# Patient Record
Sex: FEMALE | Race: WHITE | NOT HISPANIC OR LATINO | Employment: PART TIME | URBAN - METROPOLITAN AREA
[De-identification: names, ages, dates, MRNs, and addresses within clinical notes are randomized per-mention and may not be internally consistent; named-entity substitution may affect disease eponyms.]

---

## 2017-05-17 ENCOUNTER — PRE VISIT (OUTPATIENT)
Dept: ORTHOPEDICS | Facility: CLINIC | Age: 62
End: 2017-05-17

## 2017-05-17 DIAGNOSIS — M25.511 RIGHT SHOULDER PAIN: Primary | ICD-10-CM

## 2017-05-17 NOTE — TELEPHONE ENCOUNTER
Pt called x 1, left VM (Kaylyn HAMPTON CMA).   Right shoulder pain.  No records in chart.   XR ordered in case Pt doesn't call back for Previsit.    Иван Beckford RN

## 2017-05-18 ENCOUNTER — RADIANT APPOINTMENT (OUTPATIENT)
Dept: GENERAL RADIOLOGY | Facility: CLINIC | Age: 62
End: 2017-05-18
Attending: ORTHOPAEDIC SURGERY
Payer: COMMERCIAL

## 2017-05-18 ENCOUNTER — OFFICE VISIT (OUTPATIENT)
Dept: ORTHOPEDICS | Facility: CLINIC | Age: 62
End: 2017-05-18
Payer: COMMERCIAL

## 2017-05-18 VITALS
HEART RATE: 76 BPM | BODY MASS INDEX: 26.29 KG/M2 | HEIGHT: 64 IN | WEIGHT: 154 LBS | SYSTOLIC BLOOD PRESSURE: 128 MMHG | DIASTOLIC BLOOD PRESSURE: 84 MMHG

## 2017-05-18 DIAGNOSIS — M75.101 TEAR OF RIGHT ROTATOR CUFF, UNSPECIFIED TEAR EXTENT: Primary | ICD-10-CM

## 2017-05-18 DIAGNOSIS — M25.511 RIGHT SHOULDER PAIN: ICD-10-CM

## 2017-05-18 PROCEDURE — 73030 X-RAY EXAM OF SHOULDER: CPT | Mod: RT | Performed by: RADIOLOGY

## 2017-05-18 PROCEDURE — 99203 OFFICE O/P NEW LOW 30 MIN: CPT | Performed by: ORTHOPAEDIC SURGERY

## 2017-05-18 ASSESSMENT — PAIN SCALES - GENERAL: PAINLEVEL: MILD PAIN (3)

## 2017-05-18 NOTE — PATIENT INSTRUCTIONS
[facility]  [address] [place] [id number]  Phone [phone]  Fax [phone]    SURGICAL INFORMATION & INSTRUCTIONS  Dr. aMrtha Peralta  Name of Surgery: Right shoulder arthroscopy, open biceps tenodesis, posssible arthroscopic cuff repair  Date of Surgery:   A surgery scheduler will contact you within 1 week of your office visit with the surgeon.  If you don't receive a phone call, please call [phone].  Arrival Time:   Time of Surgery:    Please arrive early so that we can prepare you for surgery, if you arrive later than your scheduled arrival time your surgery may be cancelled.  Please note that scheduled times may change.    Location of Surgery:   ?  [facility]  5th Floor   [address] [place] [address]  Phone [phone]  Fax [phone]  [email or web address]  ?  [place] [address] [address]  Phone [phone]  Fax [phone]  [email or web address]    Prior to surgery  ?   Call your insurance company   Ask if you need pre-approval for your surgery.  If pre-approval is needed, please call our surgery scheduler for assistance with the pre-approval process.   If you do not have insurance, please let us know.     ?   Schedule an appointment with a Primary Care Provider for a Pre-Op Physical.  This should be done within 30 days of surgery  If you do not have a primary care provider, you may call [facility] at [phone], for an appointment.  Please have your office note and any labs or tests faxed to the facility where you are having surgery. Please be sure to request a copy of your pro-op physical and bring it with you on the day of surgery.      Tell your provider if you have any of the following:   - IF you have a pacemaker or an ICD (implanted cardiac defibrillator). If you do, please bring the ID card with you on the day of surgery  - IF you're a  smoker. People who smoke have a higher risk of infection after surgery. Ask your provider how you can quit smoking.  - If you have diabetes, work with your provider to control your blood sugar. If its not well-controlled, we may need to delay surgery (or you may have problems healing afterward).  - If your surgeon asks you to see your dentist: You'll need to complete any dental work before surgery. Your dentist must send a letter to your surgery center saying it's ok to do the surgery.  ?   Pre-Op Phone Call  You will receive a pre-op phone call 1-3 days before surgery to review your eating and drinking restrictions, review medications, and confirm surgery times.      ?   7-10 days BEFORE surgery  ? Stop taking aspirin, Plavix or aspirin products 10 days before surgery or as directed by your doctor.    ? Stop taking non-steroidal anti-inflammatory medications (naproxen/Aleve, ibuprofen/Advil/Motrin, celecoxib/Celebrex, meloxicam/Mobic) 1 week before surgery or as directed by your surgeon.  This will reduce the risk of bleeding during surgery.    ? Stop taking fish oil (Omega-3-fatty acid) 1 week before surgery.    ? It is OK to take acetaminophen (Tylenol) up until 2 hours prior to surgery.    ? Take prescription medications as directed by your doctor.  Discuss which medications to take or hold prior to your surgery, with your primary care doctor.      ? If you have diabetes, ask your primary care doctor or endocrinologist how you should take your medication(s).  ?   24 hours BEFORE surgery    ? Stop drinking alcohol (beer, wine, liquor) at least 24-hours before and after your surgery.     ?   Evening BEFORE surgery      You may eat a normal meal the night before surgery, but eat nothing after midnight.       Take a shower - to help wash away bacteria (germs) from your skin.  It s normal to have bacteria on your skin and skin protects us from these germs.  When you have surgery, we cut the skin.  Sometimes germs get  into the cuts and cause infection (illness caused by germs).  By following the showering instructions and using the special soap, you will lower the number of germs on your skin.  This decreases your chance of an infection.    o Buy or get 8 ounces of antiseptic surgical soap called 4% CHG.  Common brands of this soap are Hibiclens and Exidine.      o You can find it this soap at your local pharmacy, clinic or retail store.  If you have trouble finding it, ask your pharmacist to help you find the right substitute.      o Do not shave within 12 inches of your incision (surgical cut) area for at least 3 days before surgery.  Shaving can make small cuts in the skin.  This puts you at a higher risk of infection.  Items you will need for each shower:     Newly washed towel    4 ounces of one of the recommended soaps    Follow these instructions, the evening before surgery       Wash your hair and body with your regular shampoo and soap. Make sure you rinse the shampoo and soap from your hair and body.      Using clean hands, apply about 2 ounces of soap gently on your skin from the neck to your toes. Use on your groin area last. Do not use this soap on your face or head. If you get any soap in your eyes, ears or mouth, rinse right away.       Repeat step 2. It is very important to let the soap stay on your skin for at least 1 minute.       Rinse well and dry off using a clean towel.  If you feel any tingling, itching or other irritation, rinse right away. It is normal to feel some coolness on the skin after using the antiseptic soap. Your skin may feel a bit dry after the shower, but do not use any lotions, creams or moisturizers. Do not use hair spray or other products in your hair.      Dress in freshly washed clothes or pajamas. Use fresh pillowcases and sheets on your bed.    ?   Day of Surgery    You may drink clear liquids up to 2 hours before surgery or as directed by your surgeon.  Clear liquids include: Water,  Pedialyte, Gatorade, apple juice and liquids you can read through. Please avoid liquids that are red or orange in color.     Do NOT drink: milk, coffee, liquids that have pulp, orange juice, and lemonade or tomato juice.     Do NOT chew gum, chew tobacco or suck on hard candy.      Take another shower            Follow these instructions:        Wash your hair and body with your regular shampoo and soap. Make sure you rinse the shampoo and soap from your hair and body.      Using clean hands, apply about 2 ounces of soap gently on your skin from the neck to your toes. Use on your groin area last. Do not use this soap on your face or head. If you get any soap in your eyes, ears or mouth, rinse right away.       Repeat step 2. It is very important to let the soap stay on your skin for at least 1 minute.       Rinse well and dry off using a clean towel.  If you feel any tingling, itching or other irritation, rinse right away. It is normal to feel some coolness on the skin after using the antiseptic soap. Your skin may feel a bit dry after the shower, but do not use any lotions, creams or moisturizers. Do not use hair spray or other products in your hair.      Dress in freshly washed clothes.    Do not wear deodorant, cologne, lotion, makeup, nail polish or jewelry to surgery.    ?   If there is any change in your health PRIOR to your surgery, please contact your surgeon's office.  Such as a fever, cold, cough, rash, etc     ? Arrange for someone to drive you home after surgery.    will need to be a responsible adult (18 years or older) that will provide transportation to and from surgery and stay in the waiting room during your surgery. You may not drive yourself or take public transportation to and from surgery.    ?   Arrange for someone to stay with you for 24 hours after you go home.   This person must be a responsible adult (18 years or older).    ?   Bring these items to the hospital/surgery center:    ? Insurance card(s)  ? Money for parking and co-pays, if needed  ? A list of all the medications you take, including vitamins, minerals, herbs and over the counter medications.    ? A copy of your Advance Health Care Directive, if you have one.  This tells us what treatment(s) you would or would not want, and who would make health care decisions, if you could no longer speak for yourself.    ? A case for glasses, contact lenses, hearing aids or dentures.   ? Your inhaler or CPAP machine, if you use these at home    ?   Leave extra cash, jewelry and other valuables at home.       ?   Other information:     Sleep Apnea: Let your nurse know if you have a history of sleep apnea, only if you are having surgery at the [facility].    When you arrive for surgery  When you get to the surgery center/hospital, you will:    Check in. If you are under age 18, you must be with a parent or legal guardian.      Sign consent forms, if you haven t already. These forms state that you know the risks and benefits of surgery. When you sign the forms, you give us permission to do the surgery. Do not sign them unless you understand what will happen during and after your surgery. If you have any questions about your surgery, ask to speak with your doctor before you sign the forms. If you don t understand the answers, ask again.      Receive a copy of the Patient s Bill of Rights. If you do not receive a copy, please ask for one.      Change into hospital clothes. Your belongings will be placed in a bag. We will return them to you after surgery.      Meet with the anesthesia provider. He or she will tell you what kind of anesthesia (medicine) will be used to keep you comfortable during surgery.      Remember: it s okay to remind doctors and nurses to wash their hands before touching you.      In most cases, your surgeon will use a marker to write his or her initials on the surgery site. This ensures that the exact site is  operated on.      For safety reasons, we will ask you the same questions many times. For example, we may ask your name and birth date over and over again.      Friends and family can stay with you until it s time for surgery. While you re in surgery, they will be in the waiting area. Please note that cell phones are not allowed in some patient care areas.      If you have questions about what will happen in the operating room, talk to your care team.      You will meet with an anesthesiologist, before your surgery.  He or she may reference types of anesthesia commonly used for surgeries:     General:  This involves the use of an IV for injection of medication and anesthesia. You are put into a sleep and have a breathing tube to assist you with breathing.    Sedation:  You are asleep, but not so deply that you need a breathing tube.     Local or Regional: a nerve is injected to numb the surgical area.    Spinal: you are numbed from the waist down with medicine injected into your back.    Femoral Nerve Block:  Anesthesia injected into the groin of leg which you are having surgery on.      After surgery  We will move you to a recovery room, where we will watch you closely. If you have any pain or discomfort, tell your nurse. He or she will try to make you comfortable.      If you are staying overnight, we will move you to your hospital room after you are awake.      If you are going home, we will move you to another room. Friends and family may be able to join you. The length of time you spend in recovery depend on the type of medicine you received, your medical condition, the type of surgery you had, or your response to the anesthesia given during your procedure.      When you are discharged from the recovery room, the nurses will review instructions with you and your caregiver.      Please wash your hands every time you touch the wound or change bandages or dressings.      Do not submerge the wound in water.  You may  not use a bathtub or hot tub until the wound is closed. The wait time frame is generally 2-3 weeks, but any open area can be a source of incoming bacteria, so it is better to be on the safe side and avoid water submersion until your wound is fully healed.  Keep all dressings clean and dry.       If you had surgery on your arm or leg, elevate it as much as possible to help reduce swelling.      You may put ice on the surgical area for comfort, keeping ice on area for up to 20 minutes then off for 40 minutes.  You may do this the first few days after surgery to help reduce pain and swelling.        Drink at least 8-10 glasses of liquid each day to help your body heal.      Keep your lungs clear by coughing and taking deep breaths every couple hours.  This is especially important the first 48 hours after surgery.      Notify your doctor if you have any of the following:   o Fever of 101 F or higher  o Numbness and/or tingling  o Increased pain, redness or swelling  o Drainage from wound  o Prolonged or uncontrolled bleeding  o Difficulty with movement  ?  Physical Therapy  Think about where you would like to have physical therapy (PT) after your surgery. You will be instructed by your surgical team on when to start PT after surgery. If you have questions regarding this, please contact the orthopedic clinic.    Follow-up Appointments, in Clinic  If you don't already have an appointment scheduled, please call to set up an appointment at [phone].    ?   Post-Op appointments with provider. (2 and 6 weeks post op)    Dealing with pain  A nurse will check your comfort level often during your stay. He or she will work with you to manage your pain.  It s expected that you will have pain after surgery.  Our goal is to reduce or minimize pain by:     Remember pain is real. There are many ways to control pain. We can help you decide what works best for you.    Ask for pain medicine when you need it.  Don t try to  tough it  out --this can make you feel worse. Always take your medicine as ordered.    Medicine doesn t work the same for everyone. If your medicine isn t working, tell your nurse. There may be other medicines or treatments we can try.    Medication Refills.  If you need refills on your pain medication, please call the clinic as soon as possible.  It may take 72-business hours to obtain a refill.  Refills must be picked up at check-in 2, [facility] or mailed to a pharmacy of your choice.      It is expected that you will wean off the pain medications in a timely manner.     Constipation is a common side effect of pain medication, decreased activity and anesthesia from surgery.  Take a stool softener as prescribed by your doctor at the time of discharge.  You may also use over the counter medications as needed.  Be sure to increase your fiber (fruits and vegetables) and your water intake.      Please call the clinic at [phone], if you experience any problems or have questions.  If you are having an emergency, always call [phone] or seek immediate evaluation at the Emergency Room.    Thank you for selecting the [facility] for your care!  ---------------------------------------------

## 2017-05-18 NOTE — NURSING NOTE
"Brandee Banda's goals for this visit include: Consult for right shoulder pain.   She requests these members of her care team be copied on today's visit information: yes    PCP: No primary care provider on file.    Referring Provider:  Referred Self, MD  No address on file    Chief Complaint   Patient presents with     Consult     Right shoulder pain for 3 years. No Injury.        Initial /84  Pulse 76  Ht 1.626 m (5' 4\")  Wt 69.9 kg (154 lb)  BMI 26.43 kg/m2 Estimated body mass index is 26.43 kg/(m^2) as calculated from the following:    Height as of this encounter: 1.626 m (5' 4\").    Weight as of this encounter: 69.9 kg (154 lb).  Medication Reconciliation: complete  "

## 2017-05-18 NOTE — PROGRESS NOTES
CHIEF CONCERN:  Right shoulder pain.      REFERRING PROVIDER:  Dr. Kody Banda.      HISTORY OF PRESENT ILLNESS:  Ms. Banda is a very pleasant 62-year-old right-hand dominant female who I am seeing today at the request of my colleague, Dr. Kody Banda.  This patient is his wife.  Mrs. Banda notes that she developed pain sometime in perhaps 08/2015.  She initially noted pain when she was doing paddle boarding and golf.  Gradually, her range of motion got worse.  She went to physical therapy and her range of motion was better.  When she was seen by Dr. Carlos Cruz at [facility] in [place].  She had an injection in perhaps 08/2015 and the patient reports that that injection did not help her.  She then had an MRI in 03/2016 and had a second injection which by records was a subacromial injection.  The patient states that that injection helped quite a bit more.  Unfortunately, it has worn off.  She now notes that her range of motion is again worsening.  She is a rather active person and these symptoms were limiting for her.  She notes that the pain is present over the anterior shoulder, but also goes down into the anterior arm and she points towards the biceps muscle belly.  The pain is markedly worse with trying to internally rotate behind her, reach across or away.  She notes no numbness or tingling.      PAST MEDICAL HISTORY:     1.  Status post right ring finger trigger release in 2009 at [facility].      MEDICATIONS:  Aleve p.r.n.      ALLERGIES:  Codeine which causes severe nausea.      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  She does not use tobacco.  She drinks alcohol minimally, 1 drink per week.      FAMILY HISTORY:  Pertinent for breast and lung cancer.      REVIEW OF SYSTEMS:  Positive only for that noted in history of present illness and past medical history and otherwise negative on patient intake form, which is scanned into the chart.       PHYSICAL EXAMINATION:  On examination today, the patient is a pleasant adult female in no acute distress.  She articulates and communicates appropriately with normal affect.  She is accompanied by her adult daughter who is volunteering in the Surgery lounge at the [facility].   Respirations are even and unlabored.   FOCUSED UPPER EXTREMITY EXAM:  Inspection of the upper extremities reveals no obvious atrophy or asymmetry.  Her shoulder range of motion on the right is active equal to passive forward elevation to 165, external rotation at the side to 75 and internal rotation to the greater trochanter.  This compares to on the left active forward elevation to 180, external rotation at the side to 85 and internal rotation to T-6.  Internal rotation is markedly limited and painful.  Sensation is intact to light touch to all dermatomes and motor function is intact to EPL, FPL and intrinsics.  She has no tenderness to palpation over the AC joint.  She does have tenderness to palpation over the anterior shoulder.  The long head biceps groove.  She has a grossly positive Minier's, grossly positive Speed's.  She has a negative Yergason's test.  She has a negative empty can test.  Her external rotation strength is not painful and is 5/5.  Her forward elevation strength is near 5/5.  She has pain and weakness with a belly press test.      IMAGING:  Radiographs of the right shoulder today demonstrate no evidence of fracture or dislocation.  There is AC degenerative joint disease.  The glenohumeral joint is well preserved.      An MRI of the right shoulder was obtained at an outside facility on 03/16/2016.  This study demonstrates tendinopathy of the anterior supraspinatus with mild fraying and no full-thickness or high-grade partial thickness tear.  There appears to be a near full thickness tear of the upper border of the subscapularis.  The inferior half of the subscapularis is intact.  The long head  of the biceps is at least partially subluxed out of the groove superiorly.  The rotator cuff muscles are well preserved without atrophy.  There is evidence of degenerative labral tearing.      ASSESSMENT:   1.  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   2.  Right partial thickness rotator cuff tears, subscapularis greater than supraspinatus.      RECOMMENDATIONS:  I reviewed with Ms. Banda and her daughter my clinical and radiographic findings.  I discussed with her that her symptoms today appear to be most significant and related to the long head of the biceps.  Given the length of time since her last MRI and the appearance of the subscapularis in 2016, I would recommend obtaining a new MRI.  We will obtain that study and I will call her with the results when those are complete.  We briefly discussed management of the long head of the biceps, including options for tenotomy versus open biceps tenodesis.  We will further discuss options when her MRI is complete.

## 2017-05-18 NOTE — TELEPHONE ENCOUNTER
Pt reports being seen for : Right shoulder pain for 3 years. 2 months of PT. 2 injections. Done at [facility].   1. Do you have recent xrays (if not seen in EPIC)? No -  Patient informed that they will have x-rays done before appointment and to arrive at least 30 minutes before MD appointment. Xrays ordered per standing orders.  2. Do you have any MRI's (if not seen in EPIC)?Hand carrying MRI from [facility]. Pt informed to arrive at least 30 minutes before appointment to have have MRI scanned to system  3. Have you had surgery in the past for the same issue?No.   4. Are you being referred by another provider? No  If yes-Records in Epic or being obtained by:   5. Is this work comp or MVA related? No.

## 2017-05-18 NOTE — MR AVS SNAPSHOT
After Visit Summary   5/18/2017    Brandee Banda    MRN: [id number]           Patient Information     Date Of Birth          1955        Visit Information        Provider Department      5/18/2017 10:30 AM Martha Peralta MD [facility]        Today's Diagnoses     Tear of right rotator cuff, unspecified tear extent    -  1      Care Instructions      [facility]  [address] [place] [address]  Phone [phone]  Fax [phone]    SURGICAL INFORMATION & INSTRUCTIONS  Dr. Martha Peralta  Name of Surgery: Right shoulder arthroscopy, open biceps tenodesis, posssible arthroscopic cuff repair  Date of Surgery:   A surgery scheduler will contact you within 1 week of your office visit with the surgeon.  If you don't receive a phone call, please call [phone].  Arrival Time:   Time of Surgery:    Please arrive early so that we can prepare you for surgery, if you arrive later than your scheduled arrival time your surgery may be cancelled.  Please note that scheduled times may change.    Location of Surgery:   ?  [facility]  5th Floor   [address] [place] [address]  Phone [phone]  Fax [phone]  [email or web address]  ?  [address] [address]  Phone [phone]  Fax [phone]  [email or web address]    Prior to surgery  ?   Call your insurance company   Ask if you need pre-approval for your surgery.  If pre-approval is needed, please call our surgery scheduler for assistance with the pre-approval process.   If you do not have insurance, please let us know.     ?   Schedule an appointment with a Primary Care Provider for a Pre-Op Physical.  This should be done within 30 days of surgery  If you do not have a primary care provider, you may call [facility] at [phone], for an appointment.  Please have your office  note and any labs or tests faxed to the facility where you are having surgery. Please be sure to request a copy of your pro-op physical and bring it with you on the day of surgery.      Tell your provider if you have any of the following:   - IF you have a pacemaker or an ICD (implanted cardiac defibrillator). If you do, please bring the ID card with you on the day of surgery  - IF you're a smoker. People who smoke have a higher risk of infection after surgery. Ask your provider how you can quit smoking.  - If you have diabetes, work with your provider to control your blood sugar. If its not well-controlled, we may need to delay surgery (or you may have problems healing afterward).  - If your surgeon asks you to see your dentist: You'll need to complete any dental work before surgery. Your dentist must send a letter to your surgery center saying it's ok to do the surgery.  ?   Pre-Op Phone Call  You will receive a pre-op phone call 1-3 days before surgery to review your eating and drinking restrictions, review medications, and confirm surgery times.      ?   7-10 days BEFORE surgery  ? Stop taking aspirin, Plavix or aspirin products 10 days before surgery or as directed by your doctor.    ? Stop taking non-steroidal anti-inflammatory medications (naproxen/Aleve, ibuprofen/Advil/Motrin, celecoxib/Celebrex, meloxicam/Mobic) 1 week before surgery or as directed by your surgeon.  This will reduce the risk of bleeding during surgery.    ? Stop taking fish oil (Omega-3-fatty acid) 1 week before surgery.    ? It is OK to take acetaminophen (Tylenol) up until 2 hours prior to surgery.    ? Take prescription medications as directed by your doctor.  Discuss which medications to take or hold prior to your surgery, with your primary care doctor.      ? If you have diabetes, ask your primary care doctor or endocrinologist how you should take your medication(s).  ?   24 hours BEFORE surgery    ? Stop drinking alcohol (beer, wine,  liquor) at least 24-hours before and after your surgery.     ?   Evening BEFORE surgery      You may eat a normal meal the night before surgery, but eat nothing after midnight.       Take a shower - to help wash away bacteria (germs) from your skin.  It s normal to have bacteria on your skin and skin protects us from these germs.  When you have surgery, we cut the skin.  Sometimes germs get into the cuts and cause infection (illness caused by germs).  By following the showering instructions and using the special soap, you will lower the number of germs on your skin.  This decreases your chance of an infection.    o Buy or get 8 ounces of antiseptic surgical soap called 4% CHG.  Common brands of this soap are Hibiclens and Exidine.      o You can find it this soap at your local pharmacy, clinic or retail store.  If you have trouble finding it, ask your pharmacist to help you find the right substitute.      o Do not shave within 12 inches of your incision (surgical cut) area for at least 3 days before surgery.  Shaving can make small cuts in the skin.  This puts you at a higher risk of infection.  Items you will need for each shower:     Newly washed towel    4 ounces of one of the recommended soaps    Follow these instructions, the evening before surgery       Wash your hair and body with your regular shampoo and soap. Make sure you rinse the shampoo and soap from your hair and body.      Using clean hands, apply about 2 ounces of soap gently on your skin from the neck to your toes. Use on your groin area last. Do not use this soap on your face or head. If you get any soap in your eyes, ears or mouth, rinse right away.       Repeat step 2. It is very important to let the soap stay on your skin for at least 1 minute.       Rinse well and dry off using a clean towel.  If you feel any tingling, itching or other irritation, rinse right away. It is normal to feel some coolness on the skin after using the antiseptic soap.  Your skin may feel a bit dry after the shower, but do not use any lotions, creams or moisturizers. Do not use hair spray or other products in your hair.      Dress in freshly washed clothes or pajamas. Use fresh pillowcases and sheets on your bed.    ?   Day of Surgery    You may drink clear liquids up to 2 hours before surgery or as directed by your surgeon.  Clear liquids include: Water, Pedialyte, Gatorade, apple juice and liquids you can read through. Please avoid liquids that are red or orange in color.     Do NOT drink: milk, coffee, liquids that have pulp, orange juice, and lemonade or tomato juice.     Do NOT chew gum, chew tobacco or suck on hard candy.      Take another shower            Follow these instructions:        Wash your hair and body with your regular shampoo and soap. Make sure you rinse the shampoo and soap from your hair and body.      Using clean hands, apply about 2 ounces of soap gently on your skin from the neck to your toes. Use on your groin area last. Do not use this soap on your face or head. If you get any soap in your eyes, ears or mouth, rinse right away.       Repeat step 2. It is very important to let the soap stay on your skin for at least 1 minute.       Rinse well and dry off using a clean towel.  If you feel any tingling, itching or other irritation, rinse right away. It is normal to feel some coolness on the skin after using the antiseptic soap. Your skin may feel a bit dry after the shower, but do not use any lotions, creams or moisturizers. Do not use hair spray or other products in your hair.      Dress in freshly washed clothes.    Do not wear deodorant, cologne, lotion, makeup, nail polish or jewelry to surgery.    ?   If there is any change in your health PRIOR to your surgery, please contact your surgeon's office.  Such as a fever, cold, cough, rash, etc     ? Arrange for someone to drive you home after surgery.    will need to be a responsible adult (18 years  or older) that will provide transportation to and from surgery and stay in the waiting room during your surgery. You may not drive yourself or take public transportation to and from surgery.    ?   Arrange for someone to stay with you for 24 hours after you go home.   This person must be a responsible adult (18 years or older).    ?   Bring these items to the hospital/surgery center:   ? Insurance card(s)  ? Money for parking and co-pays, if needed  ? A list of all the medications you take, including vitamins, minerals, herbs and over the counter medications.    ? A copy of your Advance Health Care Directive, if you have one.  This tells us what treatment(s) you would or would not want, and who would make health care decisions, if you could no longer speak for yourself.    ? A case for glasses, contact lenses, hearing aids or dentures.   ? Your inhaler or CPAP machine, if you use these at home    ?   Leave extra cash, jewelry and other valuables at home.       ?   Other information:     Sleep Apnea: Let your nurse know if you have a history of sleep apnea, only if you are having surgery at the [facility].    When you arrive for surgery  When you get to the surgery center/hospital, you will:    Check in. If you are under age 18, you must be with a parent or legal guardian.      Sign consent forms, if you haven t already. These forms state that you know the risks and benefits of surgery. When you sign the forms, you give us permission to do the surgery. Do not sign them unless you understand what will happen during and after your surgery. If you have any questions about your surgery, ask to speak with your doctor before you sign the forms. If you don t understand the answers, ask again.      Receive a copy of the Patient s Bill of Rights. If you do not receive a copy, please ask for one.      Change into hospital clothes. Your belongings will be placed in a bag. We will return them to you after  surgery.      Meet with the anesthesia provider. He or she will tell you what kind of anesthesia (medicine) will be used to keep you comfortable during surgery.      Remember: it s okay to remind doctors and nurses to wash their hands before touching you.      In most cases, your surgeon will use a marker to write his or her initials on the surgery site. This ensures that the exact site is operated on.      For safety reasons, we will ask you the same questions many times. For example, we may ask your name and birth date over and over again.      Friends and family can stay with you until it s time for surgery. While you re in surgery, they will be in the waiting area. Please note that cell phones are not allowed in some patient care areas.      If you have questions about what will happen in the operating room, talk to your care team.      You will meet with an anesthesiologist, before your surgery.  He or she may reference types of anesthesia commonly used for surgeries:     General:  This involves the use of an IV for injection of medication and anesthesia. You are put into a sleep and have a breathing tube to assist you with breathing.    Sedation:  You are asleep, but not so deply that you need a breathing tube.     Local or Regional: a nerve is injected to numb the surgical area.    Spinal: you are numbed from the waist down with medicine injected into your back.    Femoral Nerve Block:  Anesthesia injected into the groin of leg which you are having surgery on.      After surgery  We will move you to a recovery room, where we will watch you closely. If you have any pain or discomfort, tell your nurse. He or she will try to make you comfortable.      If you are staying overnight, we will move you to your hospital room after you are awake.      If you are going home, we will move you to another room. Friends and family may be able to join you. The length of time you spend in recovery depend on the type of  medicine you received, your medical condition, the type of surgery you had, or your response to the anesthesia given during your procedure.      When you are discharged from the recovery room, the nurses will review instructions with you and your caregiver.      Please wash your hands every time you touch the wound or change bandages or dressings.      Do not submerge the wound in water.  You may not use a bathtub or hot tub until the wound is closed. The wait time frame is generally 2-3 weeks, but any open area can be a source of incoming bacteria, so it is better to be on the safe side and avoid water submersion until your wound is fully healed.  Keep all dressings clean and dry.       If you had surgery on your arm or leg, elevate it as much as possible to help reduce swelling.      You may put ice on the surgical area for comfort, keeping ice on area for up to 20 minutes then off for 40 minutes.  You may do this the first few days after surgery to help reduce pain and swelling.        Drink at least 8-10 glasses of liquid each day to help your body heal.      Keep your lungs clear by coughing and taking deep breaths every couple hours.  This is especially important the first 48 hours after surgery.      Notify your doctor if you have any of the following:   o Fever of 101 F or higher  o Numbness and/or tingling  o Increased pain, redness or swelling  o Drainage from wound  o Prolonged or uncontrolled bleeding  o Difficulty with movement  ?  Physical Therapy  Think about where you would like to have physical therapy (PT) after your surgery. You will be instructed by your surgical team on when to start PT after surgery. If you have questions regarding this, please contact the orthopedic clinic.    Follow-up Appointments, in Clinic  If you don't already have an appointment scheduled, please call to set up an appointment at [phone].    ?   Post-Op appointments with provider. (2 and 6 weeks post op)    Dealing  with pain  A nurse will check your comfort level often during your stay. He or she will work with you to manage your pain.  It s expected that you will have pain after surgery.  Our goal is to reduce or minimize pain by:     Remember pain is real. There are many ways to control pain. We can help you decide what works best for you.    Ask for pain medicine when you need it.  Don t try to  tough it out --this can make you feel worse. Always take your medicine as ordered.    Medicine doesn t work the same for everyone. If your medicine isn t working, tell your nurse. There may be other medicines or treatments we can try.    Medication Refills.  If you need refills on your pain medication, please call the clinic as soon as possible.  It may take 72-business hours to obtain a refill.  Refills must be picked up at check-in 2, [facility] or mailed to a pharmacy of your choice.      It is expected that you will wean off the pain medications in a timely manner.     Constipation is a common side effect of pain medication, decreased activity and anesthesia from surgery.  Take a stool softener as prescribed by your doctor at the time of discharge.  You may also use over the counter medications as needed.  Be sure to increase your fiber (fruits and vegetables) and your water intake.      Please call the clinic at [phone], if you experience any problems or have questions.  If you are having an emergency, always call [phone] or seek immediate evaluation at the Emergency Room.    Thank you for selecting the [facility] for your care!  ---------------------------------------------          Follow-ups after your visit        Who to contact     If you have questions or need follow up information about today's clinic visit or your schedule please contact [facility] directly at [phone].  Normal or non-critical lab and imaging results will be communicated to you by Gabriela  "letter or phone within 4 business days after the clinic has received the results. If you do not hear from us within 7 days, please contact the clinic through kabuku or phone. If you have a critical or abnormal lab result, we will notify you by phone as soon as possible.  Submit refill requests through kabuku or call your pharmacy and they will forward the refill request to us. Please allow 3 business days for your refill to be completed.          Additional Information About Your Visit        kabuku Information     kabuku is an electronic gateway that provides easy, online access to your medical records. With kabuku, you can request a clinic appointment, read your test results, renew a prescription or communicate with your care team.     To sign up for kabuku visit the website at [email or web address]   You will be asked to enter the access code listed below, as well as some personal information. Please follow the directions to create your username and password.     Your access code is: [email or web address]  Expires: 2017  6:30 AM     Your access code will  in 90 days. If you need help or a new code, please contact your [facility] or call [phone] for assistance.        Care EveryWhere ID     This is your Care EveryWhere ID. This could be used by other organizations to access your [facility] medical records  [id number]        Your Vitals Were     Pulse Height BMI (Body Mass Index)             76 1.626 m (5' 4\") 26.43 kg/m2          Blood Pressure from Last 3 Encounters:   17 128/84    Weight from Last 3 Encounters:   17 69.9 kg (154 lb)               Primary Care Provider    None Specified       No primary provider on file.        Thank you!     Thank you for choosing [facility]  for your care. Our goal is always to provide you with excellent care. Hearing back from our patients is one way we can continue to improve our services. Please " take a few minutes to complete the written survey that you may receive in the mail after your visit with us. Thank you!             Your Updated Medication List - Protect others around you: Learn how to safely use, store and throw away your medicines at www.disposemymeds.org.          This list is accurate as of: 5/18/17 11:59 PM.  Always use your most recent med list.                   Brand Name Dispense Instructions for use    ALEVE PO

## 2017-05-25 ENCOUNTER — TELEPHONE (OUTPATIENT)
Dept: ORTHOPEDICS | Facility: CLINIC | Age: 62
End: 2017-05-25

## 2017-05-25 DIAGNOSIS — M67.921 BICEPS TENDINOPATHY, RIGHT: Primary | ICD-10-CM

## 2017-05-25 NOTE — TELEPHONE ENCOUNTER
Procedure: Right shoulder arthroscopy, open biceps tenodesis, posssible arthroscopic cuff repair  Facility: [facility] or [facility]  Length: 90minute(s)  Surgeon: Fabian ESTRADA  Anesthesia: Choice and Interscalene Block  BMI: There is no height or weight on file to calculate BMI. (If over 43 for general or 45 for MAC cannot be scheduled at [facility])   Pre-op Appointments needed: H&P within 30 days of surgery  Post-op appointments needed:2 weeks   provider only 6 weeks   provider only   needed:  No  Surgery packet/instructions given to patient?  to be faxed to Pt at home  Special Considerations: none    Called Pt and left VM to callback to confirm home fax number: [phone].

## 2017-05-26 NOTE — TELEPHONE ENCOUNTER
I left a message for the patient to call me back to get the surgery scheduled.  Sravanthi Carpenter, Surgery Scheduling Coordinator

## 2017-06-05 ENCOUNTER — TRANSFERRED RECORDS (OUTPATIENT)
Dept: HEALTH INFORMATION MANAGEMENT | Facility: CLINIC | Age: 62
End: 2017-06-05

## 2017-06-10 ENCOUNTER — HEALTH MAINTENANCE LETTER (OUTPATIENT)
Age: 62
End: 2017-06-10

## 2017-06-12 ENCOUNTER — ANESTHESIA (OUTPATIENT)
Dept: SURGERY | Facility: AMBULATORY SURGERY CENTER | Age: 62
End: 2017-06-12

## 2017-06-12 ENCOUNTER — HOSPITAL ENCOUNTER (OUTPATIENT)
Facility: AMBULATORY SURGERY CENTER | Age: 62
Discharge: HOME | End: 2017-06-12
Attending: ORTHOPAEDIC SURGERY | Admitting: ORTHOPAEDIC SURGERY
Payer: COMMERCIAL

## 2017-06-12 ENCOUNTER — ANESTHESIA EVENT (OUTPATIENT)
Dept: SURGERY | Facility: AMBULATORY SURGERY CENTER | Age: 62
End: 2017-06-12

## 2017-06-12 VITALS
DIASTOLIC BLOOD PRESSURE: 65 MMHG | HEIGHT: 64 IN | RESPIRATION RATE: 16 BRPM | BODY MASS INDEX: 26.29 KG/M2 | OXYGEN SATURATION: 99 % | TEMPERATURE: 98 F | WEIGHT: 154 LBS | SYSTOLIC BLOOD PRESSURE: 110 MMHG

## 2017-06-12 PROCEDURE — G8907 PT DOC NO EVENTS ON DISCHARG: HCPCS

## 2017-06-12 PROCEDURE — 29823 SHO ARTHRS SRG XTNSV DBRDMT: CPT | Mod: RT

## 2017-06-12 PROCEDURE — 23430 REPAIR BICEPS TENDON: CPT | Mod: RT

## 2017-06-12 PROCEDURE — G8918 PT W/O PREOP ORDER IV AB PRO: HCPCS

## 2017-06-12 PROCEDURE — 23430 REPAIR BICEPS TENDON: CPT | Mod: RT | Performed by: ORTHOPAEDIC SURGERY

## 2017-06-12 PROCEDURE — 29823 SHO ARTHRS SRG XTNSV DBRDMT: CPT | Mod: RT | Performed by: ORTHOPAEDIC SURGERY

## 2017-06-12 PROCEDURE — 29827 SHO ARTHRS SRG RT8TR CUF RPR: CPT | Mod: RT

## 2017-06-12 PROCEDURE — 29827 SHO ARTHRS SRG RT8TR CUF RPR: CPT | Mod: RT | Performed by: ORTHOPAEDIC SURGERY

## 2017-06-12 DEVICE — IMPLANTABLE DEVICE: Type: IMPLANTABLE DEVICE | Site: SHOULDER | Status: FUNCTIONAL

## 2017-06-12 RX ORDER — ONDANSETRON 2 MG/ML
INJECTION INTRAMUSCULAR; INTRAVENOUS PRN
Status: DISCONTINUED | OUTPATIENT
Start: 2017-06-12 | End: 2017-06-12

## 2017-06-12 RX ORDER — MEPERIDINE HYDROCHLORIDE 25 MG/ML
12.5 INJECTION INTRAMUSCULAR; INTRAVENOUS; SUBCUTANEOUS
Status: DISCONTINUED | OUTPATIENT
Start: 2017-06-12 | End: 2017-06-13 | Stop reason: HOSPADM

## 2017-06-12 RX ORDER — FLUMAZENIL 0.1 MG/ML
0.2 INJECTION, SOLUTION INTRAVENOUS
Status: DISCONTINUED | OUTPATIENT
Start: 2017-06-12 | End: 2017-06-13 | Stop reason: HOSPADM

## 2017-06-12 RX ORDER — PROPOFOL 10 MG/ML
INJECTION, EMULSION INTRAVENOUS PRN
Status: DISCONTINUED | OUTPATIENT
Start: 2017-06-12 | End: 2017-06-12

## 2017-06-12 RX ORDER — FENTANYL CITRATE 50 UG/ML
25-50 INJECTION, SOLUTION INTRAMUSCULAR; INTRAVENOUS
Status: DISCONTINUED | OUTPATIENT
Start: 2017-06-12 | End: 2017-06-13 | Stop reason: HOSPADM

## 2017-06-12 RX ORDER — NALOXONE HYDROCHLORIDE 0.4 MG/ML
.1-.4 INJECTION, SOLUTION INTRAMUSCULAR; INTRAVENOUS; SUBCUTANEOUS
Status: DISCONTINUED | OUTPATIENT
Start: 2017-06-12 | End: 2017-06-13 | Stop reason: HOSPADM

## 2017-06-12 RX ORDER — SODIUM CHLORIDE, SODIUM LACTATE, POTASSIUM CHLORIDE, CALCIUM CHLORIDE 600; 310; 30; 20 MG/100ML; MG/100ML; MG/100ML; MG/100ML
INJECTION, SOLUTION INTRAVENOUS CONTINUOUS
Status: DISCONTINUED | OUTPATIENT
Start: 2017-06-12 | End: 2017-06-13 | Stop reason: HOSPADM

## 2017-06-12 RX ORDER — SODIUM CHLORIDE, SODIUM LACTATE, POTASSIUM CHLORIDE, CALCIUM CHLORIDE 600; 310; 30; 20 MG/100ML; MG/100ML; MG/100ML; MG/100ML
INJECTION, SOLUTION INTRAVENOUS CONTINUOUS PRN
Status: DISCONTINUED | OUTPATIENT
Start: 2017-06-12 | End: 2017-06-12

## 2017-06-12 RX ORDER — KETOROLAC TROMETHAMINE 30 MG/ML
30 INJECTION, SOLUTION INTRAMUSCULAR; INTRAVENOUS EVERY 6 HOURS PRN
Status: DISCONTINUED | OUTPATIENT
Start: 2017-06-12 | End: 2017-06-13 | Stop reason: HOSPADM

## 2017-06-12 RX ORDER — CEFAZOLIN SODIUM 2 G/100ML
2 INJECTION, SOLUTION INTRAVENOUS
Status: DISCONTINUED | OUTPATIENT
Start: 2017-06-12 | End: 2017-06-13 | Stop reason: HOSPADM

## 2017-06-12 RX ORDER — ONDANSETRON 2 MG/ML
4 INJECTION INTRAMUSCULAR; INTRAVENOUS EVERY 30 MIN PRN
Status: DISCONTINUED | OUTPATIENT
Start: 2017-06-12 | End: 2017-06-13 | Stop reason: HOSPADM

## 2017-06-12 RX ORDER — DEXAMETHASONE SODIUM PHOSPHATE 4 MG/ML
INJECTION, SOLUTION INTRA-ARTICULAR; INTRALESIONAL; INTRAMUSCULAR; INTRAVENOUS; SOFT TISSUE PRN
Status: DISCONTINUED | OUTPATIENT
Start: 2017-06-12 | End: 2017-06-12

## 2017-06-12 RX ORDER — ONDANSETRON 4 MG/1
4 TABLET, ORALLY DISINTEGRATING ORAL EVERY 30 MIN PRN
Status: DISCONTINUED | OUTPATIENT
Start: 2017-06-12 | End: 2017-06-13 | Stop reason: HOSPADM

## 2017-06-12 RX ORDER — BUPIVACAINE HYDROCHLORIDE 5 MG/ML
INJECTION, SOLUTION EPIDURAL; INTRACAUDAL PRN
Status: DISCONTINUED | OUTPATIENT
Start: 2017-06-12 | End: 2017-06-12

## 2017-06-12 RX ORDER — ACETAMINOPHEN 325 MG/1
975 TABLET ORAL ONCE
Status: COMPLETED | OUTPATIENT
Start: 2017-06-12 | End: 2017-06-12

## 2017-06-12 RX ORDER — GABAPENTIN 300 MG/1
300 CAPSULE ORAL ONCE
Status: COMPLETED | OUTPATIENT
Start: 2017-06-12 | End: 2017-06-12

## 2017-06-12 RX ADMIN — PROPOFOL 100 MG: 10 INJECTION, EMULSION INTRAVENOUS at 13:13

## 2017-06-12 RX ADMIN — KETOROLAC TROMETHAMINE 30 MG: 30 INJECTION, SOLUTION INTRAMUSCULAR; INTRAVENOUS at 14:35

## 2017-06-12 RX ADMIN — GABAPENTIN 300 MG: 300 CAPSULE ORAL at 11:05

## 2017-06-12 RX ADMIN — PROPOFOL 100 MG: 10 INJECTION, EMULSION INTRAVENOUS at 12:58

## 2017-06-12 RX ADMIN — ONDANSETRON 4 MG: 2 INJECTION INTRAMUSCULAR; INTRAVENOUS at 14:15

## 2017-06-12 RX ADMIN — SODIUM CHLORIDE, SODIUM LACTATE, POTASSIUM CHLORIDE, CALCIUM CHLORIDE: 600; 310; 30; 20 INJECTION, SOLUTION INTRAVENOUS at 13:32

## 2017-06-12 RX ADMIN — ACETAMINOPHEN 975 MG: 325 TABLET ORAL at 11:05

## 2017-06-12 RX ADMIN — FENTANYL CITRATE 50 MCG: 50 INJECTION, SOLUTION INTRAMUSCULAR; INTRAVENOUS at 12:05

## 2017-06-12 RX ADMIN — PROPOFOL 80 MG: 10 INJECTION, EMULSION INTRAVENOUS at 13:04

## 2017-06-12 RX ADMIN — SODIUM CHLORIDE, SODIUM LACTATE, POTASSIUM CHLORIDE, CALCIUM CHLORIDE: 600; 310; 30; 20 INJECTION, SOLUTION INTRAVENOUS at 11:15

## 2017-06-12 RX ADMIN — DEXAMETHASONE SODIUM PHOSPHATE 4 MG: 4 INJECTION, SOLUTION INTRA-ARTICULAR; INTRALESIONAL; INTRAMUSCULAR; INTRAVENOUS; SOFT TISSUE at 14:15

## 2017-06-12 RX ADMIN — SODIUM CHLORIDE, SODIUM LACTATE, POTASSIUM CHLORIDE, CALCIUM CHLORIDE: 600; 310; 30; 20 INJECTION, SOLUTION INTRAVENOUS at 12:56

## 2017-06-12 RX ADMIN — BUPIVACAINE HYDROCHLORIDE 10 ML: 5 INJECTION, SOLUTION EPIDURAL; INTRACAUDAL at 12:10

## 2017-06-12 ASSESSMENT — ENCOUNTER SYMPTOMS: SEIZURES: 1

## 2017-06-12 NOTE — IP AVS SNAPSHOT
[facility]    [address] [place] [address]    Phone:  [phone]                                       After Visit Summary   6/12/2017    Brandee Banda    MRN: [id number]           After Visit Summary Signature Page     I have received my discharge instructions, and my questions have been answered. I have discussed any challenges I see with this plan with the nurse or doctor.    ..........................................................................................................................................  Patient/Patient Representative Signature      ..........................................................................................................................................  Patient Representative Print Name and Relationship to Patient    ..................................................               ................................................  Date                                            Time    ..........................................................................................................................................  Reviewed by Signature/Title    ...................................................              ..............................................  Date                                                            Time

## 2017-06-12 NOTE — IP AVS SNAPSHOT
MRN:[id number]                      After Visit Summary   6/12/2017    Brandee Banda    MRN: [id number]           Thank you!     Thank you for choosing [facility] for your care. Our goal is always to provide you with excellent care. Hearing back from our patients is one way we can continue to improve our services. Please take a few minutes to complete the written survey that you may receive in the mail after you visit with us. Thank you!        Patient Information     Date Of Birth          1955        About your hospital stay     You were admitted on:  June 12, 2017 You last received care in the:  [facility]    You were discharged on:  June 12, 2017       Who to Call     For medical emergencies, please call 911.  For non-urgent questions about your medical care, please call your primary care provider or clinic, None  For questions related to your surgery, please call your surgery clinic        Attending Provider     Provider Martha Lockett MD Orthopedics       Primary Care Provider    None Specified      Your next 10 appointments already scheduled     Jun 26, 2017 11:45 AM CDT   Return Visit with Martha Peralta MD   [facility] ([facility]    [address] [place] [id number] [phone]              Further instructions from your care team       [facility]  Same-Day Surgery   Adult Discharge Orders & Instructions   For 24 hours after surgery  1. Get plenty of rest.  A responsible adult must stay with you for at least 24 hours after you leave the hospital.   2. Do not drive or use heavy equipment.  If you have weakness or tingling, don't drive or use heavy equipment until this feeling goes away.  3. Do not drink alcohol.  4. Avoid strenuous or risky activities.  Ask for help when climbing stairs.   5. You may feel lightheaded.  IF so, sit for a few minutes before  "standing.  Have someone help you get up.   6. If you have nausea (feel sick to your stomach): Drink only clear liquids such as apple juice, ginger ale, broth or 7-Up.  Rest may also help.  Be sure to drink enough fluids.  Move to a regular diet as you feel able.  7. You may have a slight fever. Call the doctor if your fever is over 100 F (37.7 C) (taken under the tongue) or lasts longer than 24 hours.  8. You may have a dry mouth, a sore throat, muscle aches or trouble sleeping.  These should go away after 24 hours.  9. Do not make important or legal decisions.   Call your doctor for any of the followin.  Signs of infection (fever, growing tenderness at the surgery site, a large amount of drainage or bleeding, severe pain, foul-smelling drainage, redness, swelling).    2. It has been over 8 to 10 hours since surgery and you are still not able to urinate (pass water).    3.  Headache for over 24 hours.        Pending Results     No orders found from 6/10/2017 to 2017.            Admission Information     Date & Time Provider Department Dept. Phone    2017 Martha Peralta MD [facility] [phone]      Your Vitals Were     Blood Pressure Temperature Height Weight Pulse Oximetry BMI (Body Mass Index)    97/69 97.4  F (36.3  C) (Temporal) 1.626 m (5' 4\") 69.9 kg (154 lb) 98% 26.43 kg/m2      MyChart Information     [employer] gives you secure access to your electronic health record. If you see a primary care provider, you can also send messages to your care team and make appointments. If you have questions, please call your primary care clinic.  If you do not have a primary care provider, please call [phone] and they will assist you.        Care EveryWhere ID     This is your Care EveryWhere ID. This could be used by other organizations to access your [facility] medical records  [id number]           Review of your medicines      UNREVIEWED medicines. Ask your doctor about " these medicines        Dose / Directions    ALEVE PO        Refills:  0                Protect others around you: Learn how to safely use, store and throw away your medicines at www.disposemymeds.org.             Medication List: This is a list of all your medications and when to take them. Check marks below indicate your daily home schedule. Keep this list as a reference.      Medications           Morning Afternoon Evening Bedtime As Needed    ALEVE PO

## 2017-06-12 NOTE — DISCHARGE INSTRUCTIONS
[facility]  Same-Day Surgery   Adult Discharge Orders & Instructions   For 24 hours after surgery  1. Get plenty of rest.  A responsible adult must stay with you for at least 24 hours after you leave the hospital.   2. Do not drive or use heavy equipment.  If you have weakness or tingling, don't drive or use heavy equipment until this feeling goes away.  3. Do not drink alcohol.  4. Avoid strenuous or risky activities.  Ask for help when climbing stairs.   5. You may feel lightheaded.  IF so, sit for a few minutes before standing.  Have someone help you get up.   6. If you have nausea (feel sick to your stomach): Drink only clear liquids such as apple juice, ginger ale, broth or 7-Up.  Rest may also help.  Be sure to drink enough fluids.  Move to a regular diet as you feel able.  7. You may have a slight fever. Call the doctor if your fever is over 100 F (37.7 C) (taken under the tongue) or lasts longer than 24 hours.  8. You may have a dry mouth, a sore throat, muscle aches or trouble sleeping.  These should go away after 24 hours.  9. Do not make important or legal decisions.   Call your doctor for any of the followin.  Signs of infection (fever, growing tenderness at the surgery site, a large amount of drainage or bleeding, severe pain, foul-smelling drainage, redness, swelling).    2. It has been over 8 to 10 hours since surgery and you are still not able to urinate (pass water).    3.  Headache for over 24 hours.

## 2017-06-12 NOTE — ANESTHESIA POSTPROCEDURE EVALUATION
Patient: Brandee Banda    Procedure(s):  Right shoulder arthroscopy, open biceps tenodesis, possible arthroscopic cuff repair-Anesthesia is choice with interscalene block - Wound Class: I-Clean    Diagnosis:severe biceps tendinopathy  Diagnosis Additional Information: No value filed.    Anesthesia Type:  No value filed.    Note:  Anesthesia Post Evaluation    Patient location during evaluation: bedside  Patient participation: Able to participate in evaluation but full recovery from regional anesthesia has not yet occurred and is not anticipated to occur within 48 hours  Level of consciousness: awake  Pain management: adequate  Airway patency: patent  Cardiovascular status: acceptable  Respiratory status: acceptable  Hydration status: acceptable  PONV: controlled     Anesthetic complications: None          Last vitals:  Vitals:    06/12/17 1438 06/12/17 1453   BP: 97/69    Temp: 97.4  F (36.3  C)    SpO2: 98% 98%         Electronically Signed By: Fransico Conrad MD, MD  June 12, 2017  3:14 PM

## 2017-06-12 NOTE — ANESTHESIA CARE TRANSFER NOTE
Patient: Brandee Banda    Procedure(s):  Right shoulder arthroscopy, open biceps tenodesis, possible arthroscopic cuff repair-Anesthesia is choice with interscalene block - Wound Class: I-Clean    Diagnosis: severe biceps tendinopathy  Diagnosis Additional Information: No value filed.    Anesthesia Type:   No value filed.     Note:  Airway :Nasal Cannula  Patient transferred to:PACU  Comments: To PACU, awake, comfortable, sats 100%, NC ,Report to RN.      Vitals: (Last set prior to Anesthesia Care Transfer)    CRNA VITALS  6/12/2017 1405 - 6/12/2017 1441      6/12/2017             SpO2: 97 %                Electronically Signed By: TYRONE Coats CRNA  June 12, 2017  2:41 PM

## 2017-06-12 NOTE — ADDENDUM NOTE
Addendum  created 06/12/17 1545 by Lili Shah APRN CRNA    Anesthesia Event deleted, Anesthesia Event edited, Anesthesia Intra Flowsheets edited, Anesthesia Intra Meds edited, Procedure Event Log accessed

## 2017-06-12 NOTE — ANESTHESIA PREPROCEDURE EVALUATION
Anesthesia Evaluation     . Pt has had prior anesthetic. Type: General    History of anesthetic complications   - PONV        ROS/MED HX    ENT/Pulmonary:  - neg pulmonary ROS     Neurologic:     (+)seizures     Cardiovascular:  - neg cardiovascular ROS       METS/Exercise Tolerance:  >4 METS   Hematologic:  - neg hematologic  ROS       Musculoskeletal:  - neg musculoskeletal ROS       GI/Hepatic:  - neg GI/hepatic ROS       Renal/Genitourinary:     (+) Nephrolithiasis ,       Endo:  - neg endo ROS       Psychiatric:  - neg psychiatric ROS       Infectious Disease:  - neg infectious disease ROS       Malignancy:         Other:                     Physical Exam  Normal systems: dental    Airway   Mallampati: I  TM distance: >3 FB  Neck ROM: full    Dental     Cardiovascular   Rhythm and rate: regular and normal      Pulmonary    breath sounds clear to auscultation                    Anesthesia Plan      History & Physical Review  History and physical reviewed and following examination; no interval change.    ASA Status:  1 .    NPO Status:  > 6 hours    Plan for MAC, Periph. Nerve Block for postop pain and Peripheral Nerve Block with Intravenous induction. Maintenance will be TIVA.  Reason for MAC:  Deep or markedly invasive procedure (G8)  PONV prophylaxis:  Ondansetron (or other 5HT-3) and Dexamethasone or Solumedrol       Postoperative Care  Postoperative pain management:  Peripheral nerve block (Single Shot), Oral pain medications and Multi-modal analgesia.      Consents  Anesthetic plan, risks, benefits and alternatives discussed with:  Patient..                          .

## 2017-06-12 NOTE — ANESTHESIA PROCEDURE NOTES
Peripheral nerve/Neuraxial procedure note : Interscalene  Pre-Procedure  Performed by MARGIE ROBLES  Referred by ANAMARIA  Location: pre-op    Procedure Times:6/12/2017 12:05 PM and 6/12/2017 12:15 PM  Pre-Anesthestic Checklist: patient identified, IV checked, site marked, risks and benefits discussed, informed consent, monitors and equipment checked, pre-op evaluation, at physician/surgeon's request and post-op pain management    Timeout  Correct Patient: Yes   Correct Procedure: Yes   Correct Site: Yes   Correct Laterality: Yes   Correct Position: Yes   Site Marked: Yes   .   Procedure Documentation    .    Procedure:    Interscalene.     Ultrasound used to identify targeted nerve, plexus, or vascular marker and placed a needle adjacent to it., Ultrasound was used to visualize the spread of the anesthetic in close proximity to the above stated nerve. A permanent image is entered into the patient's record.  Patient Prep;mask, sterile gloves, chlorhexidine gluconate and isopropyl alcohol, patient draped.  .  Needle: insulated, short bevel (21 G. ). .  Spinal Needle: . . Insertion Method: Single Shot.     Assessment/Narrative  Paresthesias: No.  .  The placement was negative for: blood aspirated, painful injection and site bleeding.  Bolus given via needle..   Secured via.   Complications: none. Comments:  Discussed with Patient Off-Label use of Liposomal Bupivacaine (Exparel) for Nerve Block.    Relevant risks & benefits were discussed with patient.    All questions were answered and there was agreement to proceed.    Patient signed Off-Label Use of Exparel Consent Form.

## 2017-06-13 RX ORDER — BUPIVACAINE HYDROCHLORIDE AND EPINEPHRINE 2.5; 5 MG/ML; UG/ML
INJECTION, SOLUTION INFILTRATION; PERINEURAL PRN
Status: DISCONTINUED | OUTPATIENT
Start: 2017-06-12 | End: 2017-06-13 | Stop reason: HOSPADM

## 2017-06-15 NOTE — OP NOTE
DATE OF PROCEDURE: 6/12/2017     PREOPERATIVE DIAGNOSES:   1. Right rotator cuff disease.   2. Right long head biceps disease.   3. Right subacromial bursitis.     POSTOPERATIVE DIAGNOSES:   1. Right rotator cuff tear (full thickness subscapularis, mild partial thickness supraspinatus)  2. Right long head biceps disease with severe splitting and tearing.  3. Right subacromial bursitis     PROCEDURES:   1. Right arthroscopic rotator cuff repair (subscapularis).   2. Right arthroscopic glenohumeral debridement, extensive.   3. Right open biceps tenodesis    STAFF SURGEON: Martha Peralta MD     ANESTHESIA: MAC with supplementary interscalene block.   ESTIMATED BLOOD LOSS: 5 mL.     IMPLANTS: Arthrex 4.75 Bio-SwiveLock x 1, 6.25 SwivelLock x 1.   COMPLICATIONS: None.     BRIEF PATIENT HISTORY: Brandee Banda is a 62 year old female I have seen in clinic. Please refer to that documentation. She has an MRI which demonstrated involving the subscapularis and supraspinatus. The patient has had nonoperative management including physical therapy and prior corticosteroid injection. She has not had adequate lasting relief of pain and is at this time having lifestyle limiting symptoms. She wishes at this time to proceed with surgical intervention. We had discussed the risks and benefits of both non-operative and surgical management. We discussed the expected postoperative restrictions. We discussed the risks of surgery including failure of the cuff to heal or risk of retear (if a sizeable tear were found), risk of being no better or even worse if she  became stiff, infected, had an injury to the nerves or arteries which power the arm or hand or had a reaction to anesthesia. We discussed the postoperative rehabilitation course. The patient wished to proceed with surgery and informed consent was completed.    DESCRIPTION OF PROCEDURE: The patient was identified in the preoperative area and the correct right shoulder was  marked for surgery. The patient was provided an interscalene block by our Anesthesia colleagues and was taken to the operating room. The patient was moved to the operating table in the beachchair position with all bony prominences well padded. The head was placed in a head cage with the neck in neutral position. The patient was surrendered to sedation with monitored anesthesia care (MAC). The right upper extremity was prepped and draped in the usual sterile fashion. A timeout was held in accordance with hospital policy, confirming correct patient, side, site, procedure and administration of IV antibiotics prior to incision.   I initiated shoulder arthroscopy through a posterior viewing portal. I created an anterior working portal under direct visualization. I performed a diagnostic arthroscopy. There was severe tearing of the long head of the biceps with perhaps a 50% disruption of the long head intra-articular tendon. There was a large mobile SLAP tear with fraying of the anterior, superior, and posterior labrum. There was a high grade near full thickness vs full thickness tear of the upper 30% of the subscapularis with longitudinal splitting. There was mild fraying of the undersurface of the supraspinatus. The infra and teres were intact. There were no loose bodies in the axillary pouch. The chondral surfaces on the humerus and glenoid were well preserved.  I tenotomized the long head of the biceps and allowed it to retract out of the joint for later tenodesis. I debrided the stump back to a stable edge. I debrided the frayed and mobile degenerative SLAP tear and the fraying of the anterior and posterior labrum. I then debrided the lesser tuberosity at the site of the tear. I passed a FiberLink stitch to grasp the upper border of the subscapularis and repaired this back to a 4.75 Bio-SwiveLock in the lesser tuberosity. This had good tension on the subscapularis and nicely anatomically reapproximated it. I then  moved to the subacromial space. Upon entering the subacromial space, massive and significant bursitis was encountered. I debrided this with a shaver and an ablator. I denuded the undersurface of all soft tissues including the CA ligament. I examined the bursal surface of the rotator cuff and found it to be intact without any evidence of tearing. I then moved to the open biceps tenodesis.   I made a longitudinal incision near the axilla centered over the inferior edge of the pectoralis. I dissected down to the pec and came underneath the muscle where I identified the long head of the biceps. This tendon was delivered into the wound and starting at the musculotendinous junction I placed a stitch with FiberLoop extending 2 cm down the tendon. I removed the remaining proximal tendon. I then exposed the site for tenodesis beneath the pectoralis below the biceps groove. I placed a roseann retractor and long thin rich retractor to safely expose the site. I drilled a 6.5 tunnel and used the 6.25 SwivelLock to deliver the tendon into the tunnel. I anchored the SwivelLock with good purchase and appropriate tension on the tendon. The sutures were cut flush.   All instruments were removed from the shoulder. I injected 10ml liposomal bupivacaine mixed with 5ml 0.25% plain bupivacaine into the subQ tissues of the open biceps incision. Incisions were closed with interrupted 2-0 and 3-0 Monocryl. Steri-Strips and a soft sterile dressing were applied. The arm was placed into an abduction sling and the patient was extubated and transported to the recovery room in stable condition. There were no apparent intraoperative complications.     POSTOPERATIVE PLAN:   1. The patient will be discharged to home when she meets Same Day discharge criteria.   2. The patient will have no range of motion of the shoulder for 2 weeks but can do active hand, wrist and elbow range of motion.   3. At 2 weeks, we will start pendulums and passive FE to 90  and ER to 20. At 4 weeks we will progress to unrestricted passive motion. At 6 weeks we will advance to AAROM and then AROM over the following 4 weeks.    SABI CONRAD MD

## 2017-06-26 ENCOUNTER — OFFICE VISIT (OUTPATIENT)
Dept: ORTHOPEDICS | Facility: CLINIC | Age: 62
End: 2017-06-26
Payer: COMMERCIAL

## 2017-06-26 VITALS — DIASTOLIC BLOOD PRESSURE: 79 MMHG | SYSTOLIC BLOOD PRESSURE: 114 MMHG | HEART RATE: 73 BPM

## 2017-06-26 DIAGNOSIS — M67.921 BICEPS TENDINOPATHY, RIGHT: ICD-10-CM

## 2017-06-26 DIAGNOSIS — M75.121 COMPLETE TEAR OF RIGHT ROTATOR CUFF: Primary | ICD-10-CM

## 2017-06-26 PROCEDURE — 99024 POSTOP FOLLOW-UP VISIT: CPT | Performed by: ORTHOPAEDIC SURGERY

## 2017-06-26 ASSESSMENT — PAIN SCALES - GENERAL: PAINLEVEL: MILD PAIN (2)

## 2017-06-26 NOTE — NURSING NOTE
"Brandee Banda's goals for this visit include:  Status post Right shoulder arthroscopy, open biceps tenodesis, possible arthroscopic cuff repair. DOS 6/12/17.  She requests these members of her care team be copied on today's visit information: yes    PCP: No primary care provider on file.    Referring Provider:  No referring provider defined for this encounter.    Chief Complaint   Patient presents with     RECHECK     Status post Right shoulder arthroscopy, open biceps tenodesis, possible arthroscopic cuff repair. DOS 6/12/17.       Initial /79  Pulse 73 Estimated body mass index is 26.43 kg/(m^2) as calculated from the following:    Height as of 6/12/17: 1.626 m (5' 4\").    Weight as of 6/12/17: 69.9 kg (154 lb).  Medication Reconciliation: complete  "

## 2017-06-26 NOTE — PATIENT INSTRUCTIONS
Thanks for coming today.  Ortho/Sports Medicine Clinic  [address] [place] [address]    To schedule future appointments in Ortho Clinic, you may call [phone].    To schedule ordered imaging by your provider:   Call Central Imaging [phone]    To schedule an injection ordered by your provider:  Call Central Imaging Injection scheduling line: [phone]  [employer] available online at:  [email or web address]    Please call if any further questions or concerns ([phone]).  Clinic hours 8 am to 5 pm.    Return to clinic (call) if symptoms worsen or fail to improve.

## 2017-06-26 NOTE — MR AVS SNAPSHOT
After Visit Summary   2017    Brandee Banda    MRN: [id number]           Patient Information     Date Of Birth          1955        Visit Information        Provider Department      2017 11:45 AM Martha Peralta MD [facility]        Today's Diagnoses     Complete tear of right rotator cuff    -  1    Biceps tendinopathy, right          Care Instructions    Thanks for coming today.  Ortho/Sports Medicine Clinic  [address] [place] [id number]    To schedule future appointments in Ortho Clinic, you may call [phone].    To schedule ordered imaging by your provider:   Call Central Imaging [phone]    To schedule an injection ordered by your provider:  Call Central Imaging Injection scheduling line: [phone]  MyChart available online at:  [email or web address]    Please call if any further questions or concerns ([phone]).  Clinic hours 8 am to 5 pm.    Return to clinic (call) if symptoms worsen or fail to improve.          Follow-ups after your visit        Additional Services     PHYSICAL THERAPY REFERRAL (External-Prints)       Physical Therapy Referral                  Your next 10 appointments already scheduled     2017 10:00 AM CDT   Return Visit with Martha Peralta MD   [facility] ([facility])    [address] [place] [id number] [phone]              Who to contact     If you have questions or need follow up information about today's clinic visit or your schedule please contact [facility] directly at [phone].  Normal or non-critical lab and imaging results will be communicated to you by MyChart, letter or phone within 4 business days after the clinic has received the results. If you do not hear from us within 7 days, please contact the clinic through MyChart or phone. If you have a critical or abnormal lab result, we  will notify you by phone as soon as possible.  Submit refill requests through [employer] or call your pharmacy and they will forward the refill request to us. Please allow 3 business days for your refill to be completed.          Additional Information About Your Visit        [employer] Information     [employer] gives you secure access to your electronic health record. If you see a primary care provider, you can also send messages to your care team and make appointments. If you have questions, please call your primary care clinic.  If you do not have a primary care provider, please call [phone] and they will assist you.      [employer]) is an electronic gateway that provides easy, online access to your medical records. With [employer]), you can request a clinic appointment, read your test results, renew a prescription or communicate with your care team.     To access your existing account, please contact your [facility] or call [phone] for assistance.        Care EveryWhere ID     This is your Care EveryWhere ID. This could be used by other organizations to access your [facility] medical records  [id number]        Your Vitals Were     Pulse                   73            Blood Pressure from Last 3 Encounters:   06/26/17 114/79   06/12/17 110/65   05/18/17 128/84    Weight from Last 3 Encounters:   06/12/17 69.9 kg (154 lb)   05/18/17 69.9 kg (154 lb)              We Performed the Following     PHYSICAL THERAPY REFERRAL (External-Prints)        Primary Care Provider    None Specified       No address on file        Equal Access to Services     ANKIT VARGAS : Hadii aad ku hadasho Soellenali, waaxda luqadaha, qaybta kaalmada aderamonitada, sweta solis . So [facility] [phone].    ATENCIÓN: Si habla español, tiene a moran disposición servicios gratuitos de asistencia lingüística. Llame al [phone].    We comply with applicable federal civil rights laws and [place] laws.  We do not discriminate on the basis of race, color, national origin, age, disability sex, sexual orientation or gender identity.            Thank you!     Thank you for choosing [facility]  for your care. Our goal is always to provide you with excellent care. Hearing back from our patients is one way we can continue to improve our services. Please take a few minutes to complete the written survey that you may receive in the mail after your visit with us. Thank you!             Your Updated Medication List - Protect others around you: Learn how to safely use, store and throw away your medicines at www.disposemymeds.org.          This list is accurate as of: 6/26/17 11:59 PM.  Always use your most recent med list.                   Brand Name Dispense Instructions for use Diagnosis    ALEVE PO

## 2017-06-27 ENCOUNTER — TRANSFERRED RECORDS (OUTPATIENT)
Dept: HEALTH INFORMATION MANAGEMENT | Facility: CLINIC | Age: 62
End: 2017-06-27

## 2017-07-17 NOTE — PROGRESS NOTES
CHIEF COMPLAINT: Status post right arthroscopic rotator cuff repair (subscapularis), open biceps tenodesis   DATE OF SURGERY: 6/12/17    HISTORY OF PRESENT ILLNESS: Mrs. Banda is an extremely pleasant 62 year-old female who is 2 weeks status post the above procedure. She took no narcotics postop (not one tablet) and pain was managed with her long acting IS block NSAIDs.     EXAM:  Pleasant adult female in no distress.  Respirations even and unlabored.  Right upper extremity: Incisions clean, dry, and intact. No erythema. No drainage. Sens intact Ax/Musc/Med/Rad/Uln nerves. Motor intact EPL, FPL, and Intrinsics. Shoulder range of motion not tested due to postop restrictions.    ASSESSMENT:  1. Two weeks status post above procedure    PLAN:  I reviewed the intraop findings. We discussed the plan for rehabilitation.  She will start PT now.  I will see the patient back in 4 weeks.

## 2017-07-27 ENCOUNTER — OFFICE VISIT (OUTPATIENT)
Dept: ORTHOPEDICS | Facility: CLINIC | Age: 62
End: 2017-07-27
Payer: COMMERCIAL

## 2017-07-27 VITALS — DIASTOLIC BLOOD PRESSURE: 80 MMHG | OXYGEN SATURATION: 97 % | SYSTOLIC BLOOD PRESSURE: 120 MMHG | HEART RATE: 77 BPM

## 2017-07-27 DIAGNOSIS — M75.121 COMPLETE TEAR OF RIGHT ROTATOR CUFF: Primary | ICD-10-CM

## 2017-07-27 PROCEDURE — 99024 POSTOP FOLLOW-UP VISIT: CPT | Performed by: ORTHOPAEDIC SURGERY

## 2017-07-27 ASSESSMENT — PAIN SCALES - GENERAL: PAINLEVEL: NO PAIN (1)

## 2017-07-27 NOTE — NURSING NOTE
"Brandee Banda's goals for this visit include: Status post right arthroscopic rotator cuff repair (subscapularis), open biceps tenodesis DOS 6/12/17  She requests these members of her care team be copied on today's visit information: yes    PCP: Melina Whitten [facility]    Referring Provider:  No referring provider defined for this encounter.    Chief Complaint   Patient presents with     RECHECK     Status post right arthroscopic rotator cuff repair (subscapularis), open biceps tenodesis DOS 6/12/17       Initial /80  Pulse 77  SpO2 97% Estimated body mass index is 26.43 kg/(m^2) as calculated from the following:    Height as of 6/12/17: 1.626 m (5' 4\").    Weight as of 6/12/17: 69.9 kg (154 lb).  Medication Reconciliation: complete  "

## 2017-07-27 NOTE — MR AVS SNAPSHOT
After Visit Summary   2017    Brandee Banda    MRN: [id number]           Patient Information     Date Of Birth          1955        Visit Information        Provider Department      2017 10:00 AM Martha Peralta MD [facility]        Today's Diagnoses     Complete tear of right rotator cuff    -  1      Care Instructions    Thanks for coming today.  [facility]  [address] [place] [id number]    To schedule future appointments in Ortho Clinic, you may call [phone].    To schedule ordered imaging by your provider:   Call Central Imaging [phone]    To schedule an injection ordered by your provider:  Call Central Imaging Injection scheduling line: [phone]  [email or web address] available online at:  [email or web address]    Please call if any further questions or concerns ([phone]).  Clinic hours 8 am to 5 pm.    Return to clinic (call) if symptoms worsen or fail to improve.          Follow-ups after your visit        Your next 10 appointments already scheduled     Sep 14, 2017 10:45 AM CDT   Return Visit with Martha Peralta MD   [facility] ([facility])    [address] [place] [id number] [phone]              Who to contact     If you have questions or need follow up information about today's clinic visit or your schedule please contact [facility] directly at [phone].  Normal or non-critical lab and imaging results will be communicated to you by MyChart, letter or phone within 4 business days after the clinic has received the results. If you do not hear from us within 7 days, please contact the clinic through [employer] or phone. If you have a critical or abnormal lab result, we will notify you by phone as soon as possible.  Submit refill requests through [employer] or call your pharmacy and they will forward the refill request to  us. Please allow 3 business days for your refill to be completed.          Additional Information About Your Visit        [employer] Information     [facility] gives you secure access to your electronic health record. If you see a primary care provider, you can also send messages to your care team and make appointments. If you have questions, please call your primary care clinic.  If you do not have a primary care provider, please call [phone] and they will assist you.      [facility] [facility] [employer] is an electronic gateway that provides easy, online access to your medical records. With [facility] [employer] [facility] [employer], you can request a clinic appointment, read your test results, renew a prescription or communicate with your care team.     To access your existing account, please contact your [facility] or call [phone] for assistance.        Care EveryWhere ID     This is your Care EveryWhere ID. This could be used by other organizations to access your [facility] medical records  [id number]        Your Vitals Were     Pulse Pulse Oximetry                77 97%           Blood Pressure from Last 3 Encounters:   07/27/17 120/80   06/26/17 114/79   06/12/17 110/65    Weight from Last 3 Encounters:   06/12/17 69.9 kg (154 lb)   05/18/17 69.9 kg (154 lb)              Today, you had the following     No orders found for display       Primary Care Provider    [facility]       No address on file        Equal Access to Services     ANKIT VARGAS : Hadii aad ku hadasho Soomaali, waaxda luqadaha, qaybta kaalmada adeegyada, sweta mariee hayden solis . So [facility] [phone].    ATENCIÓN: Si habla español, tiene a moran disposición servicios gratuitos de asistencia lingüística. Llame al [phone].    We comply with applicable federal civil rights laws and [place] laws. We do not discriminate on the basis of race, color, national origin, age, disability sex, sexual orientation or gender  identity.            Thank you!     Thank you for choosing [facility]  for your care. Our goal is always to provide you with excellent care. Hearing back from our patients is one way we can continue to improve our services. Please take a few minutes to complete the written survey that you may receive in the mail after your visit with us. Thank you!             Your Updated Medication List - Protect others around you: Learn how to safely use, store and throw away your medicines at www.disposemymeds.org.          This list is accurate as of: 7/27/17 11:00 AM.  Always use your most recent med list.                   Brand Name Dispense Instructions for use Diagnosis    ALEVE PO

## 2017-07-27 NOTE — PATIENT INSTRUCTIONS
Thanks for coming today.  [facility]  [address] [place] [address]    To schedule future appointments in Ortho Clinic, you may call [phone].    To schedule ordered imaging by your provider:   Call Central Imaging [phone]    To schedule an injection ordered by your provider:  Call Central Imaging Injection scheduling line: [phone]  [email or web address] available online at:  [email or web address]    Please call if any further questions or concerns ([phone]).  Clinic hours 8 am to 5 pm.    Return to clinic (call) if symptoms worsen or fail to improve.

## 2017-07-27 NOTE — PROGRESS NOTES
CHIEF COMPLAINT: Status post right arthroscopic rotator cuff repair (subscapularis), open biceps tenodesis   DATE OF SURGERY: 6/12/17    HISTORY OF PRESENT ILLNESS: Mrs. Banda is an extremely pleasant 62 year-old female who is 6 weeks status post the above procedure.  She has been working well with the team at [facility] in [place] and has been progressing well (even ahead of schedule). She feels that the pain she had before surgery is gone - does have post-surgery discomfort which is improving.    EXAM:  Pleasant adult female in no distress.   Respirations even and unlabored.  Right upper extremity: Incisions well healed. Sens intact Ax/Musc/Med/Rad/Uln nerves. Motor intact EPL, FPL, and Intrinsics. Shoulder range of motion is passive FE to 165 (says can do full passive supine FE at home), ER at side to 60.    ASSESSMENT:  1. Six weeks status post above procedure    PLAN:  We discussed progression of regular activities and PT. Can do unrestricted AROM and light strengthening. [place] formal [place] (freestyle or backstroke) yet. May go to spin class but no leaning on handle bars - can sit upright and spin with variable resistance.   I will see the patient back in 6 weeks or prn.

## 2017-11-16 ENCOUNTER — TRANSFERRED RECORDS (OUTPATIENT)
Dept: HEALTH INFORMATION MANAGEMENT | Facility: CLINIC | Age: 62
End: 2017-11-16

## 2019-09-29 ENCOUNTER — HEALTH MAINTENANCE LETTER (OUTPATIENT)
Age: 64
End: 2019-09-29

## 2021-01-14 ENCOUNTER — HEALTH MAINTENANCE LETTER (OUTPATIENT)
Age: 66
End: 2021-01-14

## 2021-10-24 ENCOUNTER — HEALTH MAINTENANCE LETTER (OUTPATIENT)
Age: 66
End: 2021-10-24

## 2022-02-13 ENCOUNTER — HEALTH MAINTENANCE LETTER (OUTPATIENT)
Age: 67
End: 2022-02-13

## 2022-07-31 ENCOUNTER — HEALTH MAINTENANCE LETTER (OUTPATIENT)
Age: 67
End: 2022-07-31

## 2022-10-15 ENCOUNTER — HEALTH MAINTENANCE LETTER (OUTPATIENT)
Age: 67
End: 2022-10-15

## 2023-08-20 ENCOUNTER — HEALTH MAINTENANCE LETTER (OUTPATIENT)
Age: 68
End: 2023-08-20

## 2023-10-29 ENCOUNTER — HEALTH MAINTENANCE LETTER (OUTPATIENT)
Age: 68
End: 2023-10-29

## 2024-08-28 ENCOUNTER — HOSPITAL ENCOUNTER (EMERGENCY)
Facility: CLINIC | Age: 69
Discharge: HOME | End: 2024-08-29
Attending: EMERGENCY MEDICINE | Admitting: EMERGENCY MEDICINE
Payer: COMMERCIAL

## 2024-08-28 DIAGNOSIS — K52.9 COLITIS: ICD-10-CM

## 2024-08-28 DIAGNOSIS — R50.9 FEVER, UNSPECIFIED FEVER CAUSE: ICD-10-CM

## 2024-08-28 DIAGNOSIS — N20.0 KIDNEY STONE: ICD-10-CM

## 2024-08-28 DIAGNOSIS — R10.32 LEFT LOWER QUADRANT ABDOMINAL PAIN: ICD-10-CM

## 2024-08-28 LAB
ALBUMIN SERPL BCG-MCNC: 4.7 G/DL (ref 3.5–5.2)
ALP SERPL-CCNC: 64 U/L (ref 40–150)
ALT SERPL W P-5'-P-CCNC: 21 U/L (ref 0–50)
ANION GAP SERPL CALCULATED.3IONS-SCNC: 14 MMOL/L (ref 7–15)
AST SERPL W P-5'-P-CCNC: 27 U/L (ref 0–45)
BASOPHILS # BLD AUTO: 0 10E3/UL (ref 0–0.2)
BASOPHILS NFR BLD AUTO: 0 %
BILIRUB DIRECT SERPL-MCNC: <0.2 MG/DL (ref 0–0.3)
BILIRUB SERPL-MCNC: 0.4 MG/DL
BUN SERPL-MCNC: 20.9 MG/DL (ref 8–23)
CALCIUM SERPL-MCNC: 9.3 MG/DL (ref 8.8–10.4)
CHLORIDE SERPL-SCNC: 101 MMOL/L (ref 98–107)
CREAT SERPL-MCNC: 0.67 MG/DL (ref 0.51–0.95)
EGFRCR SERPLBLD CKD-EPI 2021: >90 ML/MIN/1.73M2
EOSINOPHIL # BLD AUTO: 0.1 10E3/UL (ref 0–0.7)
EOSINOPHIL NFR BLD AUTO: 1 %
ERYTHROCYTE [DISTWIDTH] IN BLOOD BY AUTOMATED COUNT: 14 % (ref 10–15)
GLUCOSE SERPL-MCNC: 120 MG/DL (ref 70–99)
HCO3 SERPL-SCNC: 22 MMOL/L (ref 22–29)
HCT VFR BLD AUTO: 37.8 % (ref 35–47)
HGB BLD-MCNC: 12.8 G/DL (ref 11.7–15.7)
IMM GRANULOCYTES # BLD: 0.1 10E3/UL
IMM GRANULOCYTES NFR BLD: 1 %
LACTATE SERPL-SCNC: 0.6 MMOL/L (ref 0.7–2)
LIPASE SERPL-CCNC: 39 U/L (ref 13–60)
LYMPHOCYTES # BLD AUTO: 3.1 10E3/UL (ref 0.8–5.3)
LYMPHOCYTES NFR BLD AUTO: 27 %
MCH RBC QN AUTO: 29.3 PG (ref 26.5–33)
MCHC RBC AUTO-ENTMCNC: 33.9 G/DL (ref 31.5–36.5)
MCV RBC AUTO: 87 FL (ref 78–100)
MONOCYTES # BLD AUTO: 0.8 10E3/UL (ref 0–1.3)
MONOCYTES NFR BLD AUTO: 7 %
NEUTROPHILS # BLD AUTO: 7.1 10E3/UL (ref 1.6–8.3)
NEUTROPHILS NFR BLD AUTO: 64 %
NRBC # BLD AUTO: 0 10E3/UL
NRBC BLD AUTO-RTO: 0 /100
PLATELET # BLD AUTO: 222 10E3/UL (ref 150–450)
POTASSIUM SERPL-SCNC: 4.3 MMOL/L (ref 3.4–5.3)
PROT SERPL-MCNC: 6.9 G/DL (ref 6.4–8.3)
RBC # BLD AUTO: 4.37 10E6/UL (ref 3.8–5.2)
SODIUM SERPL-SCNC: 137 MMOL/L (ref 135–145)
WBC # BLD AUTO: 11.2 10E3/UL (ref 4–11)

## 2024-08-28 PROCEDURE — 80048 BASIC METABOLIC PNL TOTAL CA: CPT | Performed by: EMERGENCY MEDICINE

## 2024-08-28 PROCEDURE — 96374 THER/PROPH/DIAG INJ IV PUSH: CPT | Mod: 59

## 2024-08-28 PROCEDURE — 99285 EMERGENCY DEPT VISIT HI MDM: CPT | Mod: 25

## 2024-08-28 PROCEDURE — 250N000011 HC RX IP 250 OP 636: Performed by: EMERGENCY MEDICINE

## 2024-08-28 PROCEDURE — 82248 BILIRUBIN DIRECT: CPT | Performed by: EMERGENCY MEDICINE

## 2024-08-28 PROCEDURE — 83605 ASSAY OF LACTIC ACID: CPT | Performed by: EMERGENCY MEDICINE

## 2024-08-28 PROCEDURE — 96375 TX/PRO/DX INJ NEW DRUG ADDON: CPT

## 2024-08-28 PROCEDURE — 36415 COLL VENOUS BLD VENIPUNCTURE: CPT | Performed by: EMERGENCY MEDICINE

## 2024-08-28 PROCEDURE — 258N000003 HC RX IP 258 OP 636: Performed by: EMERGENCY MEDICINE

## 2024-08-28 PROCEDURE — 96361 HYDRATE IV INFUSION ADD-ON: CPT

## 2024-08-28 PROCEDURE — 85025 COMPLETE CBC W/AUTO DIFF WBC: CPT | Performed by: EMERGENCY MEDICINE

## 2024-08-28 PROCEDURE — 83690 ASSAY OF LIPASE: CPT | Performed by: EMERGENCY MEDICINE

## 2024-08-28 PROCEDURE — 80053 COMPREHEN METABOLIC PANEL: CPT | Performed by: EMERGENCY MEDICINE

## 2024-08-28 RX ORDER — KETOROLAC TROMETHAMINE 15 MG/ML
15 INJECTION, SOLUTION INTRAMUSCULAR; INTRAVENOUS
Status: COMPLETED | OUTPATIENT
Start: 2024-08-28 | End: 2024-08-28

## 2024-08-28 RX ORDER — ONDANSETRON 2 MG/ML
4 INJECTION INTRAMUSCULAR; INTRAVENOUS ONCE
Status: COMPLETED | OUTPATIENT
Start: 2024-08-28 | End: 2024-08-28

## 2024-08-28 RX ADMIN — SODIUM CHLORIDE 1000 ML: 9 INJECTION, SOLUTION INTRAVENOUS at 23:10

## 2024-08-28 RX ADMIN — ONDANSETRON 4 MG: 2 INJECTION INTRAMUSCULAR; INTRAVENOUS at 23:10

## 2024-08-28 RX ADMIN — KETOROLAC TROMETHAMINE 15 MG: 15 INJECTION, SOLUTION INTRAMUSCULAR; INTRAVENOUS at 23:41

## 2024-08-28 ASSESSMENT — ACTIVITIES OF DAILY LIVING (ADL): ADLS_ACUITY_SCORE: 35

## 2024-08-28 ASSESSMENT — COLUMBIA-SUICIDE SEVERITY RATING SCALE - C-SSRS
1. IN THE PAST MONTH, HAVE YOU WISHED YOU WERE DEAD OR WISHED YOU COULD GO TO SLEEP AND NOT WAKE UP?: NO
6. HAVE YOU EVER DONE ANYTHING, STARTED TO DO ANYTHING, OR PREPARED TO DO ANYTHING TO END YOUR LIFE?: NO
2. HAVE YOU ACTUALLY HAD ANY THOUGHTS OF KILLING YOURSELF IN THE PAST MONTH?: NO

## 2024-08-29 ENCOUNTER — APPOINTMENT (OUTPATIENT)
Dept: CT IMAGING | Facility: CLINIC | Age: 69
End: 2024-08-29
Attending: EMERGENCY MEDICINE
Payer: COMMERCIAL

## 2024-08-29 VITALS
HEART RATE: 67 BPM | OXYGEN SATURATION: 96 % | HEIGHT: 63 IN | BODY MASS INDEX: 27.11 KG/M2 | RESPIRATION RATE: 18 BRPM | DIASTOLIC BLOOD PRESSURE: 62 MMHG | TEMPERATURE: 99.4 F | WEIGHT: 153 LBS | SYSTOLIC BLOOD PRESSURE: 117 MMHG

## 2024-08-29 LAB
ALBUMIN UR-MCNC: NEGATIVE MG/DL
APPEARANCE UR: CLEAR
BILIRUB UR QL STRIP: NEGATIVE
COLOR UR AUTO: NORMAL
GLUCOSE UR STRIP-MCNC: NEGATIVE MG/DL
HGB UR QL STRIP: NEGATIVE
HOLD SPECIMEN: NORMAL
KETONES UR STRIP-MCNC: NEGATIVE MG/DL
LEUKOCYTE ESTERASE UR QL STRIP: NEGATIVE
NITRATE UR QL: NEGATIVE
PH UR STRIP: 6.5 [PH] (ref 5–7)
RBC URINE: <1 /HPF
SP GR UR STRIP: 1.01 (ref 1–1.03)
UROBILINOGEN UR STRIP-MCNC: NORMAL MG/DL
WBC URINE: 1 /HPF

## 2024-08-29 PROCEDURE — 81001 URINALYSIS AUTO W/SCOPE: CPT | Performed by: EMERGENCY MEDICINE

## 2024-08-29 PROCEDURE — 250N000009 HC RX 250: Performed by: EMERGENCY MEDICINE

## 2024-08-29 PROCEDURE — 74177 CT ABD & PELVIS W/CONTRAST: CPT

## 2024-08-29 PROCEDURE — 250N000011 HC RX IP 250 OP 636: Performed by: EMERGENCY MEDICINE

## 2024-08-29 RX ORDER — IOPAMIDOL 755 MG/ML
76 INJECTION, SOLUTION INTRAVASCULAR ONCE
Status: COMPLETED | OUTPATIENT
Start: 2024-08-29 | End: 2024-08-29

## 2024-08-29 RX ORDER — ONDANSETRON 4 MG/1
4 TABLET, ORALLY DISINTEGRATING ORAL EVERY 8 HOURS PRN
Qty: 10 TABLET | Refills: 0 | Status: SHIPPED | OUTPATIENT
Start: 2024-08-29 | End: 2024-09-01

## 2024-08-29 RX ADMIN — SODIUM CHLORIDE 62 ML: 9 INJECTION, SOLUTION INTRAVENOUS at 00:31

## 2024-08-29 RX ADMIN — IOPAMIDOL 76 ML: 755 INJECTION, SOLUTION INTRAVENOUS at 00:31

## 2024-08-29 ASSESSMENT — ACTIVITIES OF DAILY LIVING (ADL)
ADLS_ACUITY_SCORE: 35

## 2024-08-29 NOTE — ED PROVIDER NOTES
"  Emergency Department Note      History of Present Illness     Chief Complaint   Abdominal Pain      HPI   Brandee Banda is a 69 year old female who presents with abdominal pain. The patient reports that yesterday she began having left lower quadrant pain that was initially mild but progressively worsened yesterday throughout tonight. She explains that when she got home from dinner at 1900 tonight she felt even worse, so she took her temperature which was 102.5. She took 1000 mg Tylenol immediately after she found the recorded fever. The patient also endorses nausea and constipation over the last day. She denies vomiting, urinary symptoms, cough, sore throat, rhinorrhea, and rashes. She denies history of abdominal surgery. The patient does note history of kidney stone and diverticulitis, and she believes that her current pain feels similar to the diverticulitis but not the kidney stones. She had leftover Cipro from treating her last flare up of diverticulitis, so she took one dose before presenting to the ED [facility].    Independent Historian   None    Past Medical History     Medical History and Problem List   Acromioclavicular joint arthritis, right shoulder  Seizures  Kidney stone    Medications   The patient is not currently taking any prescribed medications.    Surgical History   Rotator cuff repair (R)  Trigger finger release (R)  Tonsillectomy  Tubal ligation    Physical Exam     Patient Vitals for the past 24 hrs:   BP Temp Temp src Pulse Resp SpO2 Height Weight   08/29/24 0200 117/62 -- -- 81 -- 94 % -- --   08/29/24 0100 117/67 -- -- 89 -- 95 % -- --   08/29/24 0000 127/67 99.4  F (37.4  C) Oral 92 -- 97 % -- --   08/28/24 2237 (!) 158/81 (!) 101.5  F (38.6  C) Oral 108 18 94 % 1.6 m (5' 3\") 69.4 kg (153 lb)     Physical Exam  General: Sitting up in bed  Eyes:  The pupils are equal and round    Conjunctivae and sclerae are normal  ENT:    Atraumatic face  Neck:  Normal range of motion  CV:  Regular " rate, regular rhythm     Skin warm and well perfused   Resp:  Non labored breathing on room air    No tachypnea    No cough heard    Lungs clear bilaterally  GI:  Abdomen is soft, there is no rigidity    No distension    No rebound tenderness     Mild LLQ tenderness  MS:  Normal muscular tone  Skin:  No rash or acute skin lesions noted  Neuro:   Awake, alert.      Speech is normal and fluent.    Face is symmetric.     Moves all extremities equally  Psych: Normal affect.  Appropriate interactions.    Diagnostics     Lab Results   Labs Ordered and Resulted from Time of ED Arrival to Time of ED Departure   LACTIC ACID WHOLE BLOOD - Abnormal       Result Value    Lactic Acid 0.6 (*)    BASIC METABOLIC PANEL - Abnormal    Sodium 137      Potassium 4.3      Chloride 101      Carbon Dioxide (CO2) 22      Anion Gap 14      Urea Nitrogen 20.9      Creatinine 0.67      GFR Estimate >90      Calcium 9.3      Glucose 120 (*)    CBC WITH PLATELETS AND DIFFERENTIAL - Abnormal    WBC Count 11.2 (*)     RBC Count 4.37      Hemoglobin 12.8      Hematocrit 37.8      MCV 87      MCH 29.3      MCHC 33.9      RDW 14.0      Platelet Count 222      % Neutrophils 64      % Lymphocytes 27      % Monocytes 7      % Eosinophils 1      % Basophils 0      % Immature Granulocytes 1      NRBCs per 100 WBC 0      Absolute Neutrophils 7.1      Absolute Lymphocytes 3.1      Absolute Monocytes 0.8      Absolute Eosinophils 0.1      Absolute Basophils 0.0      Absolute Immature Granulocytes 0.1      Absolute NRBCs 0.0     LIPASE - Normal    Lipase 39     HEPATIC FUNCTION PANEL - Normal    Protein Total 6.9      Albumin 4.7      Bilirubin Total 0.4      Alkaline Phosphatase 64      AST 27      ALT 21      Bilirubin Direct <0.20     ROUTINE UA WITH MICROSCOPIC REFLEX TO CULTURE - Normal    Color Urine Light Yellow      Appearance Urine Clear      Glucose Urine Negative      Bilirubin Urine Negative      Ketones Urine Negative      Specific Gravity Urine  1.010      Blood Urine Negative      pH Urine 6.5      Protein Albumin Urine Negative      Urobilinogen Urine Normal      Nitrite Urine Negative      Leukocyte Esterase Urine Negative      RBC Urine <1      WBC Urine 1         Imaging   CT Abdomen Pelvis w Contrast   Final Result   IMPRESSION:       1.  Segmental moderate wall thickening in the mid sigmoid colon with adjacent fat stranding, compatible with colitis.      2.  Colonic diverticulosis without acute diverticulitis.      3.   1 to 3 mm nonobstructing left renal stones.        ED Course      Medications Administered   Medications   sodium chloride 0.9% BOLUS 1,000 mL (0 mLs Intravenous Stopped 8/29/24 0209)   ondansetron (ZOFRAN) injection 4 mg (4 mg Intravenous $Given 8/28/24 2310)   ketorolac (TORADOL) injection 15 mg (15 mg Intravenous $Given 8/28/24 2341)   iopamidol (ISOVUE-370) solution 76 mL (76 mLs Intravenous $Given 8/29/24 0031)   Saline Flush (62 mLs Intravenous $Given 8/29/24 0031)     Discussion of Management   None    ED Course   ED Course as of 08/29/24 0302   Wed Aug 28, 2024   2307 I obtained history and examined the patient as noted above   Thu Aug 29, 2024   0230 I rechecked the patient and explained findings.     Additional Documentation  None    Medical Decision Making / Diagnosis     MIGUEL Banda is a 69 year old female who presented to the emergency department with abdominal pain.  Patient reports left lower quadrant abdominal pain.  She is concerned for diverticulitis.  Did have a fever in the emergency department.  CT abdomen pelvis shows findings of colitis.  No evidence of diverticulitis.  Her lactic acid is normal.  She overall looks well.  She is feeling improved in the emergency department.  No bloody stool at home.  Did have vomiting and diarrhea 2 to 3 days ago.  So certainly could be viral process causing the colitis.  Given history, I do not think antibiotics are indicated.  She was not having any diarrhea in the  emergency department so unable to send stool studies.  Discussed if starts to have diarrhea, this would be a consideration.  Patient does note intermittent episodes of vomiting and diarrhea almost every week and there is family history of Crohn's disease.  She had colonoscopy about 5 years ago.  I recommended follow-up with GI in consideration for repeat colonoscopy given the findings of colitis today as well as her intermittent GI symptoms that she has often.  Return to the emergency department if not improving as expected    Disposition   The patient was discharged.     Diagnosis     ICD-10-CM    1. Kidney stone  N20.0       2. Colitis  K52.9       3. Left lower quadrant abdominal pain  R10.32       4. Fever, unspecified fever cause  R50.9          Discharge Medications   New Prescriptions    ONDANSETRON (ZOFRAN ODT) 4 MG ODT TAB    Take 1 tablet (4 mg) by mouth every 8 hours as needed for nausea.     Scribe Disclosure:  REBECCA, Son Galloway, am serving as a scribe at 11:19 PM on 8/28/2024 to document services personally performed by Cynthia Owen MD based on my observations and the provider's statements to me.        Cynthia Owen MD  08/29/24 [id number]

## 2024-08-29 NOTE — DISCHARGE INSTRUCTIONS
Talk to [facility] about another colonoscopy  Ibuprofen and tylenol for pain, fever  Zofran for nausea  Consider stool studies if start to have diarrhea  Colitis usually improves on its own

## 2024-08-29 NOTE — ED TRIAGE NOTES
States L sided abdominal pain with constipated bloating and nausea. States similar to her Diverticulitis in past. Started yesterday, tonight reports Fever at home 102.5 took 1000 mg of tyelenol 3 hours ago. Hx of sepsis with previous Diverticulitis     Triage Assessment (Adult)       Row Name 08/28/24 9585          Triage Assessment    Airway WDL WDL        Respiratory WDL    Respiratory WDL WDL        Skin Circulation/Temperature WDL    Skin Circulation/Temperature WDL WDL        Cardiac WDL    Cardiac WDL WDL        Peripheral/Neurovascular WDL    Peripheral Neurovascular WDL WDL        Cognitive/Neuro/Behavioral WDL    Cognitive/Neuro/Behavioral WDL WDL

## 2024-10-13 ENCOUNTER — HEALTH MAINTENANCE LETTER (OUTPATIENT)
Age: 69
End: 2024-10-13

## (undated) DEVICE — WRAP SHOULDER POLAR PAD XL UNV

## (undated) DEVICE — GLOVE PROTEXIS POWDER FREE 7.5 ORTHOPEDIC 2D73ET75

## (undated) DEVICE — IMM KIT SHOULDER STABILIZATION 7210573

## (undated) DEVICE — DRAPE U-POUCH 34X29" 1067

## (undated) DEVICE — PACK SHOULDER ARTHROSCOPY SOP15SAFSH

## (undated) DEVICE — DRSG STERI STRIP 1/2X4" R1547

## (undated) DEVICE — SOL NACL 0.9% IRRIG 3000ML BAG 07972-08

## (undated) DEVICE — IMM KIT SHOULDER TMAX MASK FACE 7210559

## (undated) DEVICE — PREP CHLORAPREP 26ML TINTED ORANGE  260815

## (undated) DEVICE — DRAPE STERI U 1015

## (undated) DEVICE — GLOVE PROTEXIS W/NEU-THERA 7.5  2D73TE75

## (undated) DEVICE — DRAPE MAYO STAND 23X54 8337

## (undated) DEVICE — DRAPE IOBAN INCISE 23X17" 6650EZ

## (undated) DEVICE — SU MONOCRYL 3-0 SH 27" UND Y416H

## (undated) DEVICE — ESU ARTHROWAND 90DEG RF AMBIENT SUPER TURBOVAC ASHA4250-01

## (undated) DEVICE — SOL WATER IRRIG 1000ML BOTTLE 07139-09